# Patient Record
Sex: FEMALE | Race: WHITE | NOT HISPANIC OR LATINO | Employment: FULL TIME | URBAN - METROPOLITAN AREA
[De-identification: names, ages, dates, MRNs, and addresses within clinical notes are randomized per-mention and may not be internally consistent; named-entity substitution may affect disease eponyms.]

---

## 2018-08-08 ENCOUNTER — OFFICE VISIT (OUTPATIENT)
Dept: RETAIL CLINIC | Facility: CLINIC | Age: 48
End: 2018-08-08

## 2018-08-08 VITALS
HEART RATE: 89 BPM | DIASTOLIC BLOOD PRESSURE: 88 MMHG | RESPIRATION RATE: 18 BRPM | OXYGEN SATURATION: 98 % | SYSTOLIC BLOOD PRESSURE: 134 MMHG | TEMPERATURE: 98.6 F

## 2018-08-08 DIAGNOSIS — J06.9 ACUTE URI: Primary | ICD-10-CM

## 2018-08-08 DIAGNOSIS — H65.03 BILATERAL ACUTE SEROUS OTITIS MEDIA, RECURRENCE NOT SPECIFIED: ICD-10-CM

## 2018-08-08 DIAGNOSIS — J02.9 PHARYNGITIS, UNSPECIFIED ETIOLOGY: ICD-10-CM

## 2018-08-08 PROCEDURE — 99203 OFFICE O/P NEW LOW 30 MIN: CPT | Performed by: NURSE PRACTITIONER

## 2018-08-08 RX ORDER — PREDNISONE 20 MG/1
20 TABLET ORAL DAILY
Qty: 5 TABLET | Refills: 0 | Status: SHIPPED | OUTPATIENT
Start: 2018-08-08 | End: 2018-08-13

## 2018-08-08 NOTE — PATIENT INSTRUCTIONS
"Otitis Media With Effusion  Otitis media with effusion is the presence of fluid in the middle ear. This is a common problem in children, which often follows ear infections. It may be present for weeks or longer after the infection. Unlike an acute ear infection, otitis media with effusion refers only to fluid behind the ear drum and not infection. Children with repeated ear and sinus infections and allergy problems are the most likely to get otitis media with effusion.  CAUSES   The most frequent cause of the fluid buildup is dysfunction of the eustachian tubes. These are the tubes that drain fluid in the ears to the back of the nose (nasopharynx).  SYMPTOMS   · The main symptom of this condition is hearing loss. As a result, you or your child may:  ¨ Listen to the TV at a loud volume.  ¨ Not respond to questions.  ¨ Ask \"what\" often when spoken to.  ¨ Mistake or confuse one sound or word for another.  · There may be a sensation of fullness or pressure but usually not pain.  DIAGNOSIS   · Your health care provider will diagnose this condition by examining you or your child's ears.  · Your health care provider may test the pressure in you or your child's ear with a tympanometer.  · A hearing test may be conducted if the problem persists.  TREATMENT   · Treatment depends on the duration and the effects of the effusion.  · Antibiotics, decongestants, nose drops, and cortisone-type drugs (tablets or nasal spray) may not be helpful.  · Children with persistent ear effusions may have delayed language or behavioral problems. Children at risk for developmental delays in hearing, learning, and speech may require referral to a specialist earlier than children not at risk.  · You or your child's health care provider may suggest a referral to an ear, nose, and throat surgeon for treatment. The following may help restore normal hearing:  ¨ Drainage of fluid.  ¨ Placement of ear tubes (tympanostomy tubes).  ¨ Removal of adenoids " (adenoidectomy).  HOME CARE INSTRUCTIONS   · Avoid secondhand smoke.  · Infants who are  are less likely to have this condition.  · Avoid feeding infants while they are lying flat.  · Avoid known environmental allergens.  · Avoid people who are sick.  SEEK MEDICAL CARE IF:   · Hearing is not better in 3 months.  · Hearing is worse.  · Ear pain.  · Drainage from the ear.  · Dizziness.  MAKE SURE YOU:   · Understand these instructions.  · Will watch your condition.  · Will get help right away if you are not doing well or get worse.  This information is not intended to replace advice given to you by your health care provider. Make sure you discuss any questions you have with your health care provider.  Document Released: 01/25/2006 Document Revised: 01/08/2016 Document Reviewed: 07/15/2014  [employer] Interactive Patient Education © 2017 [employer]  Pharyngitis  Pharyngitis is redness, pain, and swelling (inflammation) of the throat (pharynx). It is a very common cause of sore throat. Pharyngitis can be caused by a bacteria, but it is usually caused by a virus. Most cases of pharyngitis get better on their own without treatment.  What are the causes?  This condition may be caused by:  · Infection by viruses (viral). Viral pharyngitis spreads from person to person (is contagious) through coughing, sneezing, and sharing of personal items or utensils such as cups, forks, spoons, and toothbrushes.  · Infection by bacteria (bacterial). Bacterial pharyngitis may be spread by touching the nose or face after coming in contact with the bacteria, or through more intimate contact, such as kissing.  · Allergies. Allergies can cause buildup of mucus in the throat (post-nasal drip), leading to inflammation and irritation. Allergies can also cause blocked nasal passages, forcing breathing through the mouth, which dries and irritates the throat.    What increases the risk?  You are more likely to develop this condition  if:  · You are 5-24 years old.  · You are exposed to crowded environments such as , school, or dormitory living.  · You live in a cold climate.  · You have a weakened disease-fighting (immune) system.    What are the signs or symptoms?  Symptoms of this condition vary by the cause (viral, bacterial, or allergies) and can include:  · Sore throat.  · Fatigue.  · Low-grade fever.  · Headache.  · Joint pain and muscle aches.  · Skin rashes.  · Swollen glands in the throat (lymph nodes).  · Plaque-like film on the throat or tonsils. This is often a symptom of bacterial pharyngitis.  · Vomiting.  · Stuffy nose (nasal congestion).  · Cough.  · Red, itchy eyes (conjunctivitis).  · Loss of appetite.    How is this diagnosed?  This condition is often diagnosed based on your medical history and a physical exam. Your health care provider will ask you questions about your illness and your symptoms. A swab of your throat may be done to check for bacteria (rapid strep test). Other lab tests may also be done, depending on the suspected cause, but these are rare.  How is this treated?  This condition usually gets better in 3-4 days without medicine. Bacterial pharyngitis may be treated with antibiotic medicines.  Follow these instructions at home:  · Take over-the-counter and prescription medicines only as told by your health care provider.  ? If you were prescribed an antibiotic medicine, take it as told by your health care provider. Do not stop taking the antibiotic even if you start to feel better.  ? Do not give children aspirin because of the association with Reye syndrome.  · Drink enough water and fluids to keep your urine clear or pale yellow.  · Get a lot of rest.  · Gargle with a salt-water mixture 3-4 times a day or as needed. To make a salt-water mixture, completely dissolve ½-1 tsp of salt in 1 cup of warm water.  · If your health care provider approves, you may use throat lozenges or sprays to soothe your  "throat.  Contact a health care provider if:  · You have large, tender lumps in your neck.  · You have a rash.  · You cough up green, yellow-brown, or bloody spit.  Get help right away if:  · Your neck becomes stiff.  · You drool or are unable to swallow liquids.  · You cannot drink or take medicines without vomiting.  · You have severe pain that does not go away, even after you take medicine.  · You have trouble breathing, and it is not caused by a stuffy nose.  · You have new pain and swelling in your joints such as the knees, ankles, wrists, or elbows.  Summary  · Pharyngitis is redness, pain, and swelling (inflammation) of the throat (pharynx).  · While pharyngitis can be caused by a bacteria, the most common causes are viral.  · Most cases of pharyngitis get better on their own without treatment.  · Bacterial pharyngitis is treated with antibiotic medicines.  This information is not intended to replace advice given to you by your health care provider. Make sure you discuss any questions you have with your health care provider.  Document Released: 12/18/2006 Document Revised: 01/23/2018 Document Reviewed: 01/23/2018  [employer] Interactive Patient Education © 2018 [employer]  Upper Respiratory Infection, Adult  Most upper respiratory infections (URIs) are a viral infection of the air passages leading to the lungs. A URI affects the nose, throat, and upper air passages. The most common type of URI is nasopharyngitis and is typically referred to as \"the common cold.\"  URIs run their course and usually go away on their own. Most of the time, a URI does not require medical attention, but sometimes a bacterial infection in the upper airways can follow a viral infection. This is called a secondary infection. Sinus and middle ear infections are common types of secondary upper respiratory infections.  Bacterial pneumonia can also complicate a URI. A URI can worsen asthma and chronic obstructive pulmonary disease (COPD). " Sometimes, these complications can require emergency medical care and may be life threatening.  What are the causes?  Almost all URIs are caused by viruses. A virus is a type of germ and can spread from one person to another.  What increases the risk?  You may be at risk for a URI if:  · You smoke.  · You have chronic heart or lung disease.  · You have a weakened defense (immune) system.  · You are very young or very old.  · You have nasal allergies or asthma.  · You work in crowded or poorly ventilated areas.  · You work in health care facilities or schools.    What are the signs or symptoms?  Symptoms typically develop 2-3 days after you come in contact with a cold virus. Most viral URIs last 7-10 days. However, viral URIs from the influenza virus (flu virus) can last 14-18 days and are typically more severe. Symptoms may include:  · Runny or stuffy (congested) nose.  · Sneezing.  · Cough.  · Sore throat.  · Headache.  · Fatigue.  · Fever.  · Loss of appetite.  · Pain in your forehead, behind your eyes, and over your cheekbones (sinus pain).  · Muscle aches.    How is this diagnosed?  Your health care provider may diagnose a URI by:  · Physical exam.  · Tests to check that your symptoms are not due to another condition such as:  ? Strep throat.  ? Sinusitis.  ? Pneumonia.  ? Asthma.    How is this treated?  A URI goes away on its own with time. It cannot be cured with medicines, but medicines may be prescribed or recommended to relieve symptoms. Medicines may help:  · Reduce your fever.  · Reduce your cough.  · Relieve nasal congestion.    Follow these instructions at home:  · Take medicines only as directed by your health care provider.  · Gargle warm saltwater or take cough drops to comfort your throat as directed by your health care provider.  · Use a warm mist humidifier or inhale steam from a shower to increase air moisture. This may make it easier to breathe.  · Drink enough fluid to keep your urine clear or  pale yellow.  · Eat soups and other clear broths and maintain good nutrition.  · Rest as needed.  · Return to work when your temperature has returned to normal or as your health care provider advises. You may need to stay home longer to avoid infecting others. You can also use a face mask and careful hand washing to prevent spread of the virus.  · Increase the usage of your inhaler if you have asthma.  · Do not use any tobacco products, including cigarettes, chewing tobacco, or electronic cigarettes. If you need help quitting, ask your health care provider.  How is this prevented?  The best way to protect yourself from getting a cold is to practice good hygiene.  · Avoid oral or hand contact with people with cold symptoms.  · Wash your hands often if contact occurs.    There is no clear evidence that vitamin C, vitamin E, echinacea, or exercise reduces the chance of developing a cold. However, it is always recommended to get plenty of rest, exercise, and practice good nutrition.  Contact a health care provider if:  · You are getting worse rather than better.  · Your symptoms are not controlled by medicine.  · You have chills.  · You have worsening shortness of breath.  · You have brown or red mucus.  · You have yellow or brown nasal discharge.  · You have pain in your face, especially when you bend forward.  · You have a fever.  · You have swollen neck glands.  · You have pain while swallowing.  · You have white areas in the back of your throat.  Get help right away if:  · You have severe or persistent:  ? Headache.  ? Ear pain.  ? Sinus pain.  ? Chest pain.  · You have chronic lung disease and any of the following:  ? Wheezing.  ? Prolonged cough.  ? Coughing up blood.  ? A change in your usual mucus.  · You have a stiff neck.  · You have changes in your:  ? Vision.  ? Hearing.  ? Thinking.  ? Mood.  This information is not intended to replace advice given to you by your health care provider. Make sure you discuss  any questions you have with your health care provider.  Document Released: 06/13/2002 Document Revised: 08/20/2017 Document Reviewed: 03/25/2015  [employer] Interactive Patient Education © 2018 [employer]

## 2018-08-08 NOTE — PROGRESS NOTES
Subjective   Patient ID: Letty Gutiérrez is a 47 y.o. female presents with   Chief Complaint   Patient presents with   • Sore Throat   • Earache       Sore Throat    This is a new problem. The current episode started in the past 7 days. The problem has been gradually worsening. The pain is worse on the left side. Maximum temperature: low grade. The pain is at a severity of 7/10. Associated symptoms include coughing, diarrhea (3-4 in 24 hours. none since this AM), ear pain, headaches and trouble swallowing. Pertinent negatives include no congestion, shortness of breath or vomiting. She has had no exposure to strep or mono. She has tried NSAIDs (coricidin) for the symptoms. The treatment provided mild relief.   Earache    There is pain in both ears. This is a new problem. The current episode started in the past 7 days. The problem occurs constantly. The pain is at a severity of 5/10. Associated symptoms include coughing, diarrhea (3-4 in 24 hours. none since this AM), headaches and a sore throat. Pertinent negatives include no rhinorrhea or vomiting. She has tried NSAIDs for the symptoms. The treatment provided no relief. There is no history of a chronic ear infection, hearing loss or a tympanostomy tube.       Allergies   Allergen Reactions   • Sulfa Antibiotics GI Intolerance       The following portions of the patient's history were reviewed and updated as appropriate: allergies, current medications, past family history, past medical history, past social history, past surgical history and problem list.      Review of Systems   Constitutional: Positive for chills, diaphoresis, fatigue and fever.   HENT: Positive for ear pain, postnasal drip, sneezing, sore throat and trouble swallowing. Negative for congestion, rhinorrhea, sinus pain and sinus pressure.    Respiratory: Positive for cough and wheezing. Negative for chest tightness and shortness of breath.    Cardiovascular: Negative.    Gastrointestinal: Positive for  diarrhea (3-4 in 24 hours. none since this AM) and nausea. Negative for vomiting.        Hx of diverticulitis     Neurological: Positive for headaches.       Objective     Vitals:    08/08/18 1909   BP: 134/88   Pulse: 89   Resp: 18   Temp: 98.6 °F (37 °C)   SpO2: 98%         Physical Exam   Constitutional: She is oriented to person, place, and time. She appears well-developed and well-nourished. She does not appear ill. No distress.   HENT:   Head: Normocephalic.   Right Ear: Hearing, external ear and ear canal normal. A middle ear effusion is present.   Left Ear: Hearing, external ear and ear canal normal. A middle ear effusion is present.   Nose: Mucosal edema present. No rhinorrhea or sinus tenderness.   Mouth/Throat: Mucous membranes are normal. Posterior oropharyngeal erythema (mild) present. Tonsils are 2+ on the right. Tonsils are 2+ on the left. No tonsillar exudate.   Eyes: Conjunctivae are normal.   Sclera white.   Neck: No tracheal deviation present.   Cardiovascular: Normal rate, regular rhythm, S1 normal, S2 normal and normal heart sounds.    Pulmonary/Chest: Effort normal and breath sounds normal. No accessory muscle usage. No respiratory distress.   Abdominal: Soft. Bowel sounds are normal. There is no tenderness.   Lymphadenopathy:     She has no cervical adenopathy.   Neurological: She is alert and oriented to person, place, and time.   Skin: Skin is warm and dry.   Vitals reviewed.        Letty was seen today for sore throat and earache.    Diagnoses and all orders for this visit:    Acute URI    Pharyngitis, unspecified etiology  -     predniSONE (DELTASONE) 20 MG tablet; Take 1 tablet by mouth Daily for 5 days.    Bilateral acute serous otitis media, recurrence not specified  -     predniSONE (DELTASONE) 20 MG tablet; Take 1 tablet by mouth Daily for 5 days.        Follow-up with Primary Care Physician in 48-72 hours if these symptoms worsen or fail to improve as anticipated. Patient  "verbalizes understanding.    Otitis Media With Effusion  Otitis media with effusion is the presence of fluid in the middle ear. This is a common problem in children, which often follows ear infections. It may be present for weeks or longer after the infection. Unlike an acute ear infection, otitis media with effusion refers only to fluid behind the ear drum and not infection. Children with repeated ear and sinus infections and allergy problems are the most likely to get otitis media with effusion.  CAUSES   The most frequent cause of the fluid buildup is dysfunction of the eustachian tubes. These are the tubes that drain fluid in the ears to the back of the nose (nasopharynx).  SYMPTOMS   · The main symptom of this condition is hearing loss. As a result, you or your child may:  ¨ Listen to the TV at a loud volume.  ¨ Not respond to questions.  ¨ Ask \"what\" often when spoken to.  ¨ Mistake or confuse one sound or word for another.  · There may be a sensation of fullness or pressure but usually not pain.  DIAGNOSIS   · Your health care provider will diagnose this condition by examining you or your child's ears.  · Your health care provider may test the pressure in you or your child's ear with a tympanometer.  · A hearing test may be conducted if the problem persists.  TREATMENT   · Treatment depends on the duration and the effects of the effusion.  · Antibiotics, decongestants, nose drops, and cortisone-type drugs (tablets or nasal spray) may not be helpful.  · Children with persistent ear effusions may have delayed language or behavioral problems. Children at risk for developmental delays in hearing, learning, and speech may require referral to a specialist earlier than children not at risk.  · You or your child's health care provider may suggest a referral to an ear, nose, and throat surgeon for treatment. The following may help restore normal hearing:  ¨ Drainage of fluid.  ¨ Placement of ear tubes (tympanostomy " tubes).  ¨ Removal of adenoids (adenoidectomy).  HOME CARE INSTRUCTIONS   · Avoid secondhand smoke.  · Infants who are  are less likely to have this condition.  · Avoid feeding infants while they are lying flat.  · Avoid known environmental allergens.  · Avoid people who are sick.  SEEK MEDICAL CARE IF:   · Hearing is not better in 3 months.  · Hearing is worse.  · Ear pain.  · Drainage from the ear.  · Dizziness.  MAKE SURE YOU:   · Understand these instructions.  · Will watch your condition.  · Will get help right away if you are not doing well or get worse.  This information is not intended to replace advice given to you by your health care provider. Make sure you discuss any questions you have with your health care provider.  Document Released: 01/25/2006 Document Revised: 01/08/2016 Document Reviewed: 07/15/2014  [employer] Interactive Patient Education © 2017 [employer]  Pharyngitis  Pharyngitis is redness, pain, and swelling (inflammation) of the throat (pharynx). It is a very common cause of sore throat. Pharyngitis can be caused by a bacteria, but it is usually caused by a virus. Most cases of pharyngitis get better on their own without treatment.  What are the causes?  This condition may be caused by:  · Infection by viruses (viral). Viral pharyngitis spreads from person to person (is contagious) through coughing, sneezing, and sharing of personal items or utensils such as cups, forks, spoons, and toothbrushes.  · Infection by bacteria (bacterial). Bacterial pharyngitis may be spread by touching the nose or face after coming in contact with the bacteria, or through more intimate contact, such as kissing.  · Allergies. Allergies can cause buildup of mucus in the throat (post-nasal drip), leading to inflammation and irritation. Allergies can also cause blocked nasal passages, forcing breathing through the mouth, which dries and irritates the throat.    What increases the risk?  You are more likely  to develop this condition if:  · You are 5-24 years old.  · You are exposed to crowded environments such as , school, or dormitory living.  · You live in a cold climate.  · You have a weakened disease-fighting (immune) system.    What are the signs or symptoms?  Symptoms of this condition vary by the cause (viral, bacterial, or allergies) and can include:  · Sore throat.  · Fatigue.  · Low-grade fever.  · Headache.  · Joint pain and muscle aches.  · Skin rashes.  · Swollen glands in the throat (lymph nodes).  · Plaque-like film on the throat or tonsils. This is often a symptom of bacterial pharyngitis.  · Vomiting.  · Stuffy nose (nasal congestion).  · Cough.  · Red, itchy eyes (conjunctivitis).  · Loss of appetite.    How is this diagnosed?  This condition is often diagnosed based on your medical history and a physical exam. Your health care provider will ask you questions about your illness and your symptoms. A swab of your throat may be done to check for bacteria (rapid strep test). Other lab tests may also be done, depending on the suspected cause, but these are rare.  How is this treated?  This condition usually gets better in 3-4 days without medicine. Bacterial pharyngitis may be treated with antibiotic medicines.  Follow these instructions at home:  · Take over-the-counter and prescription medicines only as told by your health care provider.  ? If you were prescribed an antibiotic medicine, take it as told by your health care provider. Do not stop taking the antibiotic even if you start to feel better.  ? Do not give children aspirin because of the association with Reye syndrome.  · Drink enough water and fluids to keep your urine clear or pale yellow.  · Get a lot of rest.  · Gargle with a salt-water mixture 3-4 times a day or as needed. To make a salt-water mixture, completely dissolve ½-1 tsp of salt in 1 cup of warm water.  · If your health care provider approves, you may use throat lozenges or  "sprays to soothe your throat.  Contact a health care provider if:  · You have large, tender lumps in your neck.  · You have a rash.  · You cough up green, yellow-brown, or bloody spit.  Get help right away if:  · Your neck becomes stiff.  · You drool or are unable to swallow liquids.  · You cannot drink or take medicines without vomiting.  · You have severe pain that does not go away, even after you take medicine.  · You have trouble breathing, and it is not caused by a stuffy nose.  · You have new pain and swelling in your joints such as the knees, ankles, wrists, or elbows.  Summary  · Pharyngitis is redness, pain, and swelling (inflammation) of the throat (pharynx).  · While pharyngitis can be caused by a bacteria, the most common causes are viral.  · Most cases of pharyngitis get better on their own without treatment.  · Bacterial pharyngitis is treated with antibiotic medicines.  This information is not intended to replace advice given to you by your health care provider. Make sure you discuss any questions you have with your health care provider.  Document Released: 12/18/2006 Document Revised: 01/23/2018 Document Reviewed: 01/23/2018  [employer] Interactive Patient Education © 2018 [employer]  Upper Respiratory Infection, Adult  Most upper respiratory infections (URIs) are a viral infection of the air passages leading to the lungs. A URI affects the nose, throat, and upper air passages. The most common type of URI is nasopharyngitis and is typically referred to as \"the common cold.\"  URIs run their course and usually go away on their own. Most of the time, a URI does not require medical attention, but sometimes a bacterial infection in the upper airways can follow a viral infection. This is called a secondary infection. Sinus and middle ear infections are common types of secondary upper respiratory infections.  Bacterial pneumonia can also complicate a URI. A URI can worsen asthma and chronic obstructive " pulmonary disease (COPD). Sometimes, these complications can require emergency medical care and may be life threatening.  What are the causes?  Almost all URIs are caused by viruses. A virus is a type of germ and can spread from one person to another.  What increases the risk?  You may be at risk for a URI if:  · You smoke.  · You have chronic heart or lung disease.  · You have a weakened defense (immune) system.  · You are very young or very old.  · You have nasal allergies or asthma.  · You work in crowded or poorly ventilated areas.  · You work in health care facilities or schools.    What are the signs or symptoms?  Symptoms typically develop 2-3 days after you come in contact with a cold virus. Most viral URIs last 7-10 days. However, viral URIs from the influenza virus (flu virus) can last 14-18 days and are typically more severe. Symptoms may include:  · Runny or stuffy (congested) nose.  · Sneezing.  · Cough.  · Sore throat.  · Headache.  · Fatigue.  · Fever.  · Loss of appetite.  · Pain in your forehead, behind your eyes, and over your cheekbones (sinus pain).  · Muscle aches.    How is this diagnosed?  Your health care provider may diagnose a URI by:  · Physical exam.  · Tests to check that your symptoms are not due to another condition such as:  ? Strep throat.  ? Sinusitis.  ? Pneumonia.  ? Asthma.    How is this treated?  A URI goes away on its own with time. It cannot be cured with medicines, but medicines may be prescribed or recommended to relieve symptoms. Medicines may help:  · Reduce your fever.  · Reduce your cough.  · Relieve nasal congestion.    Follow these instructions at home:  · Take medicines only as directed by your health care provider.  · Gargle warm saltwater or take cough drops to comfort your throat as directed by your health care provider.  · Use a warm mist humidifier or inhale steam from a shower to increase air moisture. This may make it easier to breathe.  · Drink enough fluid  to keep your urine clear or pale yellow.  · Eat soups and other clear broths and maintain good nutrition.  · Rest as needed.  · Return to work when your temperature has returned to normal or as your health care provider advises. You may need to stay home longer to avoid infecting others. You can also use a face mask and careful hand washing to prevent spread of the virus.  · Increase the usage of your inhaler if you have asthma.  · Do not use any tobacco products, including cigarettes, chewing tobacco, or electronic cigarettes. If you need help quitting, ask your health care provider.  How is this prevented?  The best way to protect yourself from getting a cold is to practice good hygiene.  · Avoid oral or hand contact with people with cold symptoms.  · Wash your hands often if contact occurs.    There is no clear evidence that vitamin C, vitamin E, echinacea, or exercise reduces the chance of developing a cold. However, it is always recommended to get plenty of rest, exercise, and practice good nutrition.  Contact a health care provider if:  · You are getting worse rather than better.  · Your symptoms are not controlled by medicine.  · You have chills.  · You have worsening shortness of breath.  · You have brown or red mucus.  · You have yellow or brown nasal discharge.  · You have pain in your face, especially when you bend forward.  · You have a fever.  · You have swollen neck glands.  · You have pain while swallowing.  · You have white areas in the back of your throat.  Get help right away if:  · You have severe or persistent:  ? Headache.  ? Ear pain.  ? Sinus pain.  ? Chest pain.  · You have chronic lung disease and any of the following:  ? Wheezing.  ? Prolonged cough.  ? Coughing up blood.  ? A change in your usual mucus.  · You have a stiff neck.  · You have changes in your:  ? Vision.  ? Hearing.  ? Thinking.  ? Mood.  This information is not intended to replace advice given to you by your health care  provider. Make sure you discuss any questions you have with your health care provider.  Document Released: 06/13/2002 Document Revised: 08/20/2017 Document Reviewed: 03/25/2015  [employer] Interactive Patient Education © 2018 [employer]

## 2019-03-25 ENCOUNTER — TELEPHONE (OUTPATIENT)
Dept: SURGERY | Facility: CLINIC | Age: 49
End: 2019-03-25

## 2019-03-25 DIAGNOSIS — R92.8 ABNORMAL MAMMOGRAM: Primary | ICD-10-CM

## 2019-03-25 NOTE — TELEPHONE ENCOUNTER
Scheduled Bilateral 3D Diag Mammo and Bilateral Breast U/S  Attn: Dr Barrow  3-29-19 @ 9:00      Spoke to pt she v/u with appts  kdl

## 2019-03-29 ENCOUNTER — APPOINTMENT (OUTPATIENT)
Dept: MAMMOGRAPHY | Facility: HOSPITAL | Age: 49
End: 2019-03-29

## 2019-03-29 ENCOUNTER — APPOINTMENT (OUTPATIENT)
Dept: ULTRASOUND IMAGING | Facility: HOSPITAL | Age: 49
End: 2019-03-29

## 2019-04-01 ENCOUNTER — TELEPHONE (OUTPATIENT)
Dept: SURGERY | Facility: CLINIC | Age: 49
End: 2019-04-01

## 2019-04-01 NOTE — TELEPHONE ENCOUNTER
Harry garzag mammo tonya scheduled Tuesday 4/16/19 arrive 7:45 am   Dr. Barrow to do.     Patient will see Dr. Carvalho same day arrive 9:00.     Images ordered. Lml

## 2019-04-16 ENCOUNTER — HOSPITAL ENCOUNTER (OUTPATIENT)
Dept: MAMMOGRAPHY | Facility: HOSPITAL | Age: 49
Discharge: HOME | End: 2019-04-16
Admitting: SURGERY

## 2019-04-16 ENCOUNTER — OFFICE VISIT (OUTPATIENT)
Dept: SURGERY | Facility: CLINIC | Age: 49
End: 2019-04-16

## 2019-04-16 ENCOUNTER — HOSPITAL ENCOUNTER (OUTPATIENT)
Dept: ULTRASOUND IMAGING | Facility: HOSPITAL | Age: 49
Discharge: HOME | End: 2019-04-16

## 2019-04-16 VITALS
DIASTOLIC BLOOD PRESSURE: 86 MMHG | SYSTOLIC BLOOD PRESSURE: 140 MMHG | OXYGEN SATURATION: 98 % | HEIGHT: 68 IN | BODY MASS INDEX: 37.47 KG/M2 | WEIGHT: 247.2 LBS | HEART RATE: 80 BPM

## 2019-04-16 DIAGNOSIS — Z72.0 TOBACCO USE: ICD-10-CM

## 2019-04-16 DIAGNOSIS — R92.8 ABNORMAL MAMMOGRAM: ICD-10-CM

## 2019-04-16 DIAGNOSIS — E66.9 OBESITY (BMI 30-39.9): ICD-10-CM

## 2019-04-16 DIAGNOSIS — R92.8 ABNORMAL MAMMOGRAM: Primary | ICD-10-CM

## 2019-04-16 DIAGNOSIS — Z80.3 FH: BREAST CANCER: ICD-10-CM

## 2019-04-16 DIAGNOSIS — R92.8 ABNORMAL ULTRASOUND OF BREAST: ICD-10-CM

## 2019-04-16 PROCEDURE — 76642 ULTRASOUND BREAST LIMITED: CPT

## 2019-04-16 PROCEDURE — 77066 DX MAMMO INCL CAD BI: CPT

## 2019-04-16 PROCEDURE — 99243 OFF/OP CNSLTJ NEW/EST LOW 30: CPT | Performed by: SURGERY

## 2019-04-16 RX ORDER — SUCRALFATE 1 G/1
TABLET ORAL
COMMUNITY
Start: 2019-03-19

## 2019-04-16 RX ORDER — AMLODIPINE BESYLATE 5 MG/1
5 TABLET ORAL DAILY
Refills: 0 | COMMUNITY
Start: 2019-03-26

## 2019-04-16 RX ORDER — LISINOPRIL AND HYDROCHLOROTHIAZIDE 25; 20 MG/1; MG/1
TABLET ORAL
Refills: 0 | COMMUNITY
Start: 2019-03-27

## 2019-04-16 RX ORDER — PANTOPRAZOLE SODIUM 40 MG/1
TABLET, DELAYED RELEASE ORAL
COMMUNITY
Start: 2019-03-19

## 2019-04-16 NOTE — PROGRESS NOTES
Chief Complaint: Letty Gutiérrez is a 48 y.o. female who was seen in consultation at the request of PASHA Sosa  for abnormal breast imaging    History of Present Illness:  Patient presents with abnormal breast imaging. She noted no new masses, skin changes, nipple discharge, nipple changes prior to her most recent imaging.    Her most recent imaging includes the followin2019  [place]   Mammo  Letty Gutiérrez  MAMMO SCREENING BILATERAL  Heterogeneously dense.  Focal asymmetries bilateral retroareolar and deep posterior right breast.  BI-RADS CATEGORY: 0    2019  [facility]    Radiology  Letty Gutiérrez  BILATERAL DIAGNOSTIC MAMMOGRAM  DIAGNOSTIC BILATERAL BREAST ULRASOUND  On compression view suggestion of an 8 mm diameter irregular density 8 cm from the nipple in the right breast; suggestion of several poorly defined nodular densities in the retroareolar regions, left somewhat greater than right.  ULTRASOUND:  Right breast:  12:00 8 cm from the nipple, corresponding to findings on right view hypoechoic structure measuring 8 x 4 mm. Microlobulated. Minimally suspicious for neoplasia. Mildly prominent ductal structures in the retroareolar regions all quadrants. 3:00 1 cm from the nipple measures 4 mm. Minimally suspicious for neoplasia. At 7:00 3 cm from the nipple hypoechoic elongated structure 1.3 x 3.9 mm having the appearance of a cyst. 9:00 5 cm from the nipple very irregular structure which is heterogeneous and quite difficult to classify in its entirety. Some suspicious features of poorly defined margins and possibly taller than wide 5 x 7 mm.  Left breast:  1:00 7 cm from the nipple 5 to 6 mm in length. Mildly suspicious for neoplasia. 1:00 3 cm from the nipple mixed echogenic mass. 5.4 x 2.1 mm minimally suspicious for neoplasia. 2:00 3 cm from the nipple area of heterogeneity. Within the center of this is a hypoechoic structure measuring 2.6 x 2.0 mm. minimally suspicious for  neoplasia. 8:00 1 cm from the nipple. Appearance of a possible cyst 4 x 8 mm.  IMPRESSION:  Because of the numerous densities, I would recommend that the patient have bilateral breast MRI.  BI-RADS CATEGORY: 0      She has not had a breast biopsy in the past.  She has her uterus and ovaries, is premenopausal, and takes nor hormones.  Her family history includes the following: She has no daughters, 1 son, 1 brother, 3 sisters, 2 maternal aunts, one paternal aunt.  She has a maternal aunt who had breast cancer in her mid 50s and a maternal great aunt who had breast cancer she is uncertain what age.  Her mother did not had breast cancer and out of her maternal cousins have had breast cancer.    She is here for evaluation.    Review of Systems:  Review of Systems   Eyes: Positive for eye problems (BLURRINESS).   Musculoskeletal: Positive for arthralgias and back pain.   Psychiatric/Behavioral: Positive for confusion, decreased concentration, depression and sleep disturbance. The patient is nervous/anxious.    All other systems reviewed and are negative.       Past Medical and Surgical History:  Breast Biopsy History:  Patient has not had a breast biopsy in the past.  Breast Cancer HIstory:  Patient does not have a past medical history of breast cancer.  Breast Operations, and year:  NONE   Obstetric/Gynecologic History:  Age menstrual periods began:16  Patient is premenopausal, first day of last period: 4/12/19  Number of pregnancies:1  Number of live births: 1  Number of abortions or miscarriages: 0  Age of delivery of first child: 32  Patient did not breast feed.  Length of time taking birth control pills: 20-22  Patient has never taken hormone replacement  PATIENT STILL HAS UTERUS AND OVARIES     Past Surgical History:   Procedure Laterality Date   • CHOLECYSTECTOMY       Past Medical History:   Diagnosis Date   • Acid reflux    • Depression    • Diverticulitis    • Elevated cholesterol    • Gastritis    • Hematuria   "  • Hypertension    • IBS (irritable bowel syndrome)    • Migraines    • Restless leg    • Restless leg    • Vitamin D deficiency        Prior Hospitalizations, other than for surgery or childbirth, and year:  NONE     Social History     Socioeconomic History   • Marital status:      Spouse name: Not on file   • Number of children: Not on file   • Years of education: Not on file   • Highest education level: Not on file   Tobacco Use   • Smoking status: Current Every Day Smoker     Packs/day: 0.50     Start date: 1994   • Smokeless tobacco: Never Used   Substance and Sexual Activity   • Alcohol use: Yes     Comment: rarely   • Drug use: No     Patient is .  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    Family History:  Family History   Problem Relation Age of Onset   • Heart disease Mother    • Alzheimer's disease Mother    • Parkinsonism Mother    • Heart disease Father    • Diabetes Father    • Breast cancer Maternal Aunt         50s   • Breast cancer Maternal Aunt         50S       Vital Signs:  /86 (BP Location: Left arm, Patient Position: Sitting, Cuff Size: Adult)   Pulse 80   Ht 172.7 cm (68\")   Wt 112 kg (247 lb 3.2 oz)   LMP 04/12/2019 (Approximate)   SpO2 98%   Breastfeeding? No   BMI 37.59 kg/m²      Medications:    Current Outpatient Medications:   •  amLODIPine (NORVASC) 5 MG tablet, Take 5 mg by mouth Daily., Disp: , Rfl: 0  •  Lactobacillus (PROBIOTIC ACIDOPHILUS PO), Take  by mouth., Disp: , Rfl:   •  lisinopril-hydrochlorothiazide (PRINZIDE,ZESTORETIC) 20-25 MG per tablet, , Disp: , Rfl: 0  •  pantoprazole (PROTONIX) 40 MG EC tablet, , Disp: , Rfl:   •  RANITIDINE HCL PO, Take  by mouth., Disp: , Rfl:   •  sucralfate (CARAFATE) 1 g tablet, , Disp: , Rfl:   •  Chlorpheniramine-DM (CORICIDIN HBP COUGH/COLD PO), Take  by mouth., Disp: , Rfl:      Allergies:  Allergies   Allergen Reactions " "  • Sulfa Antibiotics GI Intolerance       Physical Examination:  /86 (BP Location: Left arm, Patient Position: Sitting, Cuff Size: Adult)   Pulse 80   Ht 172.7 cm (68\")   Wt 112 kg (247 lb 3.2 oz)   LMP 04/12/2019 (Approximate)   SpO2 98%   Breastfeeding? No   BMI 37.59 kg/m²   General Appearance:  Patient is in no distress.  She is well kept and has an obese build.   Psychiatric:  Patient with appropriate mood and affect. Alert and oriented to self, time, and place.    Breast, RIGHT:  small sized, 40B, symmetric with the contralateral side.  Breast skin is without erythema, edema, rashes.  There are no visible abnormalities upon inspection during the arm-raising maneuver or with hands on hips in the sitting position. There is no nipple retraction, discharge or nipple/areolar skin changes.There are no masses palpable in the sitting or supine positions. Specifically at the RIGHT 12:00, 8 CFN location there are no focal findings/masses.    Breast, LEFT:  small sized, 40B, symmetric with the contralateral side.  Breast skin is without erythema, edema, rashes.  There are no visible abnormalities upon inspection during the arm-raising maneuver or with hands on hips in the sitting position. There is no nipple retraction, discharge or nipple/areolar skin changes.There are no masses palpable in the sitting or supine positions.    Lymphatic:  There is no axillary, cervical, infraclavicular, or supraclavicular adenopathy bilaterally.  Eyes:  Pupils are round and reactive to light.  Cardiovascular:  Heart rate and rhythm are regular.  Respiratory:  Lungs are clear bilaterally with no crackles or wheezes in any lung field.  Gastrointestinal:  Abdomen is soft, nondistended, and nontender.     Musculoskeletal:  Good strength in all 4 extremities.   There is good range of motion in both shoulders.    Skin:  No new skin lesions or rashes on the skin excluding the breast (see breast exam " above).        Imagin2019  [place]   Mammo  [facility]  MAMMO SCREENING BILATERAL  Heterogeneously dense.  Focal asymmetries bilateral retroareolar and deep posterior right breast.  BI-RADS CATEGORY: 0    2019  [facility]  BILATERAL DIAGNOSTIC MAMMOGRAM  DIAGNOSTIC BILATERAL BREAST ULRASOUND  On compression view suggestion of an 8 mm diameter irregular density 8 cm from the nipple in the right breast; suggestion of several poorly defined nodular densities in the retroareolar regions, left somewhat greater than right.  ULTRASOUND:  Right breast:  12:00 8 cm from the nipple, corresponding to findings on right view hypoechoic structure measuring 8 x 4 mm. Microlobulated. Minimally suspicious for neoplasia. Mildly prominent ductal structures in the retroareolar regions all quadrants. 3:00 1 cm from the nipple measures 4 mm. Minimally suspicious for neoplasia. At 7:00 3 cm from the nipple hypoechoic elongated structure 1.3 x 3.9 mm having the appearance of a cyst. 9:00 5 cm from the nipple very irregular structure which is heterogeneous and quite difficult to classify in its entirety. Some suspicious features of poorly defined margins and possibly taller than wide 5 x 7 mm.  Left breast:  1:00 7 cm from the nipple 5 to 6 mm in length. Mildly suspicious for neoplasia. 1:00 3 cm from the nipple mixed echogenic mass. 5.4 x 2.1 mm minimally suspicious for neoplasia. 2:00 3 cm from the nipple area of heterogeneity. Within the center of this is a hypoechoic structure measuring 2.6 x 2.0 mm. minimally suspicious for neoplasia. 8:00 1 cm from the nipple. Appearance of a possible cyst 4 x 8 mm.  IMPRESSION:  Because of the numerous densities, I would recommend that the patient have bilateral breast MRI.  BI-RADS CATEGORY: 0    Procedures:      Assessment:   Diagnosis Plan   1. Abnormal mammogram     2. Abnormal ultrasound of breast     3. Obesity (BMI 30-39.9)     4. FH: breast  cancer     5. Tobacco use     1-2  RIGHT breast 12:00, 8 CFN- density on mammogram, 8mm complex cyst on ultrasound- BR3 RIGHT US. LEFT imaging negative.    3-  BMI 37    4-  MA in her mid 50s, MGA, age uncertain    5-  1/2ppd since 1994    Plan:  We reviewed her history, outside imaging reports, imaging here at [facility] and report, and exam together today. (note that we ordered her repeat workup today for prior to her visit)    Dr Barrow and I spoke about her imaging and imaging reports and he has a copy of those from 2-25-19 and 3-21-19 [facility]..    The screening mammogram showed only one area about which he had any suspicion, and on ultrasound this showed a BR3 lesion at 12:00 RIGHT.    He did not feel that there were any other concerning findings requiring workup.    The patient and I discussed by phone that we had 2 options based on her outside imaging (which was bilateral BIRADS 0, recommend MRI, after Dx bilateral mammogram and US). These options were to proceed with MRI or to repeat the diagnostic imaging with mammography and ultrasound with our breast trained imager.  We agreed upon the latter.  She had this imaging workup done today .  She had a favorable report today.     We discussed that screening mammograms are used to detect changes in imaging from the prior year or to detect findings at baseline. If there are changes seen, this study is followed by diagnostic imaging, often a diagnostic mammogram with or without targeted breast ultrasound. We discussed that a BIRADS 3 designation describes an imaging finding that is 97-98% likely to represent a benign process. We discussed that the most common management of a BIRADS 3 finding is 6 month imaging surveillance with examination. We discussed that the alternate management option is to biopsy the lesion, if the concern and anxiety over the imaging was not acceptable to the patient. She understood the above, and wished to proceed with imaging surveillance in  6 months with an exam thereafter.    I will arrange for a RIGHT breast utlrasound here at [facility] for  and to see me back after.       We also reviewed her FH breast cancer and we discussed that her MA may consider talking with genetics due to her young age at diagnosis.    I asked her to continue her SBE  monthly and to let us know if she has any changes or concerns in the interim.      Tatum Carvalho MD        Next Appointment:  Return for Next scheduled follow up, after imaging.      EMR Dragon/transcription disclaimer:    Much of this encounter note is an electronic transcription/translocation of spoken language to printed text.  The electronic translation of spoken language may permit erroneous, or at times, nonsensical words or phrases to be inadvertently transcribed.  Although I have reviewed the note from such areas, some may still exist.

## 2019-04-18 ENCOUNTER — TELEPHONE (OUTPATIENT)
Dept: SURGERY | Facility: CLINIC | Age: 49
End: 2019-04-18

## 2019-04-18 DIAGNOSIS — R92.8 ABNORMAL MAMMOGRAM: Primary | ICD-10-CM

## 2019-04-18 NOTE — TELEPHONE ENCOUNTER
Final report on bilateral diagnostic mammogram and right breast ultrasound [facility] Dr. Barrow dated April 17, 2019: With additional imaging no abnormality is seen within the retroareolar regions of both breasts.  Comparison is made to prior mammograms of March 21, 2019 and February 25, 2019.  In the posterior one third right breast there is a persistent asymmetry at locates to the superior half of the breast near 12:00.  Measures approximately 8 mm.  Right breast ultrasound shows at the 12 o'clock position, 8 cm from the nipple is a 7 mm mass with macrolobulated.  BI-RADS 3 recommend six-month mammographic and sonographic follow-up of the right breast.  There is no mammographic evidence for any abnormality in the retroareolar region of either breast.  BI-RADS 3    Will add mammogram to her order.

## 2019-04-30 ENCOUNTER — TELEPHONE (OUTPATIENT)
Dept: SURGERY | Facility: CLINIC | Age: 49
End: 2019-04-30

## 2019-04-30 NOTE — TELEPHONE ENCOUNTER
SPOKE W/ PT IN REGARDS TO APPTS SCHEDULED. PT REQUESTED I MAIL APPT DATES & TIMES. PT WILL CALL IF CONFLICT.        KL

## 2019-07-18 ENCOUNTER — TELEPHONE (OUTPATIENT)
Dept: SURGERY | Facility: CLINIC | Age: 49
End: 2019-07-18

## 2019-07-18 NOTE — TELEPHONE ENCOUNTER
Alyse called, canceled her imaging at [facility], said radiologist is not in net work.    She is going to call back and let me know   Who is in net work, so we can get her imaging scheduled.      claudy

## 2019-07-18 NOTE — TELEPHONE ENCOUNTER
Scheduled Left Breast Stereostactic Biopsy x2 with Post Bx Mammo.  [facility] (Dr Barrow) 7-23-19 arrive 11:30      See Dr Albert on   7-26-19 arrive 11:30    Spoke to pt she v/u with appts  kdl

## 2019-07-18 NOTE — TELEPHONE ENCOUNTER
Scheduled Consult with   Dr Saavedra and Dr Tapia  7-25-19 arrive at 7:00    Spoke to pt she is aware of these appts  kdl

## 2019-10-15 ENCOUNTER — APPOINTMENT (OUTPATIENT)
Dept: MAMMOGRAPHY | Facility: HOSPITAL | Age: 49
End: 2019-10-15

## 2019-10-15 ENCOUNTER — APPOINTMENT (OUTPATIENT)
Dept: ULTRASOUND IMAGING | Facility: HOSPITAL | Age: 49
End: 2019-10-15

## 2019-11-05 ENCOUNTER — TELEPHONE (OUTPATIENT)
Dept: SURGERY | Facility: CLINIC | Age: 49
End: 2019-11-05

## 2019-11-05 NOTE — TELEPHONE ENCOUNTER
Pt No Showed for her imaging.    I have rescheduled her Rt Breast Diagnostic 3D Mammo and Rt Breast U/S   @ [facility] 12-6-19 @ 9:00    Return to see Dr REED 1-17-20 arrive 9:45    Lm on pt's phone  [email or web address]

## 2019-12-06 ENCOUNTER — APPOINTMENT (OUTPATIENT)
Dept: ULTRASOUND IMAGING | Facility: HOSPITAL | Age: 49
End: 2019-12-06

## 2019-12-06 ENCOUNTER — APPOINTMENT (OUTPATIENT)
Dept: MAMMOGRAPHY | Facility: HOSPITAL | Age: 49
End: 2019-12-06

## 2019-12-27 ENCOUNTER — HOSPITAL ENCOUNTER (OUTPATIENT)
Dept: ULTRASOUND IMAGING | Facility: HOSPITAL | Age: 49
Discharge: HOME | End: 2019-12-27

## 2019-12-27 ENCOUNTER — TELEPHONE (OUTPATIENT)
Dept: SURGERY | Facility: CLINIC | Age: 49
End: 2019-12-27

## 2019-12-27 ENCOUNTER — HOSPITAL ENCOUNTER (OUTPATIENT)
Dept: MAMMOGRAPHY | Facility: HOSPITAL | Age: 49
Discharge: HOME | End: 2019-12-27
Admitting: SURGERY

## 2019-12-27 DIAGNOSIS — R92.8 ABNORMAL MAMMOGRAM: ICD-10-CM

## 2019-12-27 DIAGNOSIS — R92.8 ABNORMAL ULTRASOUND OF BREAST: ICD-10-CM

## 2019-12-27 PROCEDURE — 76642 ULTRASOUND BREAST LIMITED: CPT

## 2019-12-27 PROCEDURE — G0279 TOMOSYNTHESIS, MAMMO: HCPCS

## 2019-12-27 PROCEDURE — 77065 DX MAMMO INCL CAD UNI: CPT

## 2019-12-27 NOTE — TELEPHONE ENCOUNTER
Right diagnostic mammogram with 3D December 27, 2019: And right breast ultrasound.  [facility].  Scattered fibroglandular densities.  Nodular density posterior one third right breast is less apparent.  Correlating right breast ultrasound demonstrates a hypoechoic nodule at 12:00, 8 cm from the nipple measuring 5 x 5 mm.  Nodule is slightly smaller since the ultrasound exam of April 16, 2019 when it measured 7 x 7 mm.  Conclusion benign right-sided mammogram and right breast ultrasound.  Recommend annual mammogram and limited right breast ultrasound in April 2020. BR2

## 2020-01-10 ENCOUNTER — TELEPHONE (OUTPATIENT)
Dept: SURGERY | Facility: CLINIC | Age: 50
End: 2020-01-10

## 2020-01-10 NOTE — TELEPHONE ENCOUNTER
Moved patient;'s appointment off of 1-17-20 to 1-15-20 arrive 3:15    Lm on patient's phone.  rene

## 2020-03-27 DIAGNOSIS — R92.8 ABNORMAL ULTRASOUND OF BREAST: Primary | ICD-10-CM

## 2020-04-02 ENCOUNTER — TELEPHONE (OUTPATIENT)
Dept: SURGERY | Facility: CLINIC | Age: 50
End: 2020-04-02

## 2020-04-02 NOTE — TELEPHONE ENCOUNTER
[facility] [place] 6/5/2020 12:45  Follow up rescheduled due to COVID19  Friday 7/24/2020 2:00.   Patient has no new concerns.

## 2020-06-03 RX ORDER — DICYCLOMINE HYDROCHLORIDE 10 MG/1
10 CAPSULE ORAL 3 TIMES DAILY PRN
Qty: 90 CAPSULE | Refills: 5 | Status: SHIPPED | OUTPATIENT
Start: 2020-06-03

## 2020-06-05 ENCOUNTER — HOSPITAL ENCOUNTER (OUTPATIENT)
Dept: ULTRASOUND IMAGING | Facility: HOSPITAL | Age: 50
Discharge: HOME | End: 2020-06-05
Admitting: SURGERY

## 2020-06-05 ENCOUNTER — HOSPITAL ENCOUNTER (OUTPATIENT)
Dept: MAMMOGRAPHY | Facility: HOSPITAL | Age: 50
Discharge: HOME | End: 2020-06-05

## 2020-06-05 DIAGNOSIS — R92.8 ABNORMAL ULTRASOUND OF BREAST: ICD-10-CM

## 2020-06-05 PROCEDURE — G0279 TOMOSYNTHESIS, MAMMO: HCPCS

## 2020-06-05 PROCEDURE — 76642 ULTRASOUND BREAST LIMITED: CPT

## 2020-06-05 PROCEDURE — 77066 DX MAMMO INCL CAD BI: CPT

## 2020-06-09 ENCOUNTER — TELEPHONE (OUTPATIENT)
Dept: SURGERY | Facility: CLINIC | Age: 50
End: 2020-06-09

## 2020-06-09 NOTE — TELEPHONE ENCOUNTER
[facility] bilateral mammogram with right breast ultrasound: Scattered fibroglandular densities.  No suspicious findings in either breast.  Right ultrasound 12:00, 8 cm from the nipple there is a stable 4 mm benign complex cyst.  BI-RADS 2

## 2020-07-09 ENCOUNTER — TELEPHONE (OUTPATIENT)
Dept: SURGERY | Facility: CLINIC | Age: 50
End: 2020-07-09

## 2020-07-16 ENCOUNTER — TELEPHONE (OUTPATIENT)
Dept: SURGERY | Facility: CLINIC | Age: 50
End: 2020-07-16

## 2020-12-02 ENCOUNTER — TELEPHONE (OUTPATIENT)
Dept: SURGERY | Facility: CLINIC | Age: 50
End: 2020-12-02

## 2020-12-02 NOTE — TELEPHONE ENCOUNTER
Julisa canceled her appt tomorrow with Dr REED.  She said work is going live with Epic and due to Covid in her work place.    I will call her after the holidays and get her rescheduled.    Melba

## 2021-01-14 ENCOUNTER — TELEPHONE (OUTPATIENT)
Dept: SURGERY | Facility: CLINIC | Age: 51
End: 2021-01-14

## 2021-04-30 ENCOUNTER — OFFICE VISIT (OUTPATIENT)
Dept: SURGERY | Facility: CLINIC | Age: 51
End: 2021-04-30

## 2021-04-30 VITALS
HEIGHT: 68 IN | BODY MASS INDEX: 41.52 KG/M2 | WEIGHT: 274 LBS | DIASTOLIC BLOOD PRESSURE: 72 MMHG | SYSTOLIC BLOOD PRESSURE: 124 MMHG | TEMPERATURE: 97.3 F | HEART RATE: 84 BPM | OXYGEN SATURATION: 98 %

## 2021-04-30 DIAGNOSIS — E66.01 MORBID (SEVERE) OBESITY DUE TO EXCESS CALORIES (HCC): ICD-10-CM

## 2021-04-30 DIAGNOSIS — R92.8 ABNORMAL ULTRASOUND OF BREAST: ICD-10-CM

## 2021-04-30 DIAGNOSIS — R92.8 ABNORMAL MAMMOGRAM: Primary | ICD-10-CM

## 2021-04-30 DIAGNOSIS — Z80.3 FH: BREAST CANCER: ICD-10-CM

## 2021-04-30 DIAGNOSIS — Z72.0 TOBACCO USE: ICD-10-CM

## 2021-04-30 PROCEDURE — 99213 OFFICE O/P EST LOW 20 MIN: CPT | Performed by: SURGERY

## 2021-04-30 RX ORDER — ACETAMINOPHEN AND CODEINE PHOSPHATE 120; 12 MG/5ML; MG/5ML
1 SOLUTION ORAL DAILY
COMMUNITY

## 2021-04-30 RX ORDER — AMLODIPINE BESYLATE 10 MG/1
10 TABLET ORAL DAILY
COMMUNITY
Start: 2021-03-25

## 2021-04-30 RX ORDER — BUPROPION HYDROCHLORIDE 100 MG/1
TABLET ORAL
COMMUNITY

## 2021-04-30 RX ORDER — BUSPIRONE HYDROCHLORIDE 10 MG/1
10 TABLET ORAL EVERY 12 HOURS
COMMUNITY
Start: 2021-02-05

## 2021-04-30 RX ORDER — CYCLOBENZAPRINE HCL 5 MG
5 TABLET ORAL
COMMUNITY
Start: 2021-04-29 | End: 2021-05-14

## 2021-04-30 RX ORDER — VENLAFAXINE HYDROCHLORIDE 75 MG/1
75 CAPSULE, EXTENDED RELEASE ORAL DAILY
COMMUNITY
End: 2021-04-30

## 2021-04-30 RX ORDER — AMOXICILLIN AND CLAVULANATE POTASSIUM 875; 125 MG/1; MG/1
TABLET, FILM COATED ORAL
COMMUNITY
Start: 2021-04-21 | End: 2021-04-30

## 2021-04-30 RX ORDER — CITALOPRAM 10 MG/1
10 TABLET ORAL DAILY
COMMUNITY
Start: 2021-03-25

## 2021-04-30 RX ORDER — CETIRIZINE HYDROCHLORIDE 5 MG/1
TABLET ORAL DAILY
COMMUNITY

## 2021-04-30 NOTE — PROGRESS NOTES
Chief Complaint: Letty Gutiérrez is a 50 y.o. female who was seen in consultation at the request of PASHA Sosa  for abnormal breast imaging and a followup visit    History of Present Illness:  Patient presents with abnormal breast imaging. She noted no new masses, skin changes, nipple discharge, nipple changes prior to her most recent imaging.    Her most recent imaging includes the followin2019  [facility]   Mammo  Letty Gutiérrez  MAMMO SCREENING BILATERAL  Heterogeneously dense.  Focal asymmetries bilateral retroareolar and deep posterior right breast.  BI-RADS CATEGORY: 0    2019  [facility]    Radiology  Letty Gutiérrez  BILATERAL DIAGNOSTIC MAMMOGRAM  DIAGNOSTIC BILATERAL BREAST ULRASOUND  On compression view suggestion of an 8 mm diameter irregular density 8 cm from the nipple in the right breast; suggestion of several poorly defined nodular densities in the retroareolar regions, left somewhat greater than right.  ULTRASOUND:  Right breast:  12:00 8 cm from the nipple, corresponding to findings on right view hypoechoic structure measuring 8 x 4 mm. Microlobulated. Minimally suspicious for neoplasia. Mildly prominent ductal structures in the retroareolar regions all quadrants. 3:00 1 cm from the nipple measures 4 mm. Minimally suspicious for neoplasia. At 7:00 3 cm from the nipple hypoechoic elongated structure 1.3 x 3.9 mm having the appearance of a cyst. 9:00 5 cm from the nipple very irregular structure which is heterogeneous and quite difficult to classify in its entirety. Some suspicious features of poorly defined margins and possibly taller than wide 5 x 7 mm.  Left breast:  1:00 7 cm from the nipple 5 to 6 mm in length. Mildly suspicious for neoplasia. 1:00 3 cm from the nipple mixed echogenic mass. 5.4 x 2.1 mm minimally suspicious for neoplasia. 2:00 3 cm from the nipple area of heterogeneity. Within the center of this is a hypoechoic structure measuring 2.6 x 2.0 mm. minimally  suspicious for neoplasia. 8:00 1 cm from the nipple. Appearance of a possible cyst 4 x 8 mm.  IMPRESSION:  Because of the numerous densities, I would recommend that the patient have bilateral breast MRI.  BI-RADS CATEGORY: 0      She has not had a breast biopsy in the past.  She has her uterus and ovaries, is premenopausal, and takes nor hormones.  Her family history includes the following: She has no daughters, 1 son, 1 brother, 3 sisters, 2 maternal aunts, one paternal aunt.  She has a maternal aunt who had breast cancer in her mid 50s and a maternal great aunt who had breast cancer she is uncertain what age.  Her mother did not had breast cancer and out of her maternal cousins have had breast cancer.      Interval HIstory  In the interim,  Letty Gutiérrez  has done well.  She has noted no changes in her breast exam. No new masses, skin changes, nipple changes, nipple discharge either breast.   She denies headache, bone pain, belly pain, cough, changes in vision or gait.    Her most recent imaging includes the following:  Final report on bilateral diagnostic mammogram and right breast ultrasound [facility] Dr. Barrow dated April 17, 2019: With additional imaging no abnormality is seen within the retroareolar regions of both breasts.  Comparison is made to prior mammograms of March 21, 2019 and February 25, 2019.  In the posterior one third right breast there is a persistent asymmetry at locates to the superior half of the breast near 12:00.  Measures approximately 8 mm.  Right breast ultrasound shows at the 12 o'clock position, 8 cm from the nipple is a 7 mm mass with macrolobulated.  BI-RADS 3 recommend six-month mammographic and sonographic follow-up of the right breast.  There is no mammographic evidence for any abnormality in the retroareolar region of either breast.  BI-RADS 3    We then arranged for a RIGHT DX mammo and RIGHT US:    Right diagnostic mammogram with 3D December 27, 2019: And  right breast ultrasound.  [facility].  Scattered fibroglandular densities.  Nodular density posterior one third right breast is less apparent.  Correlating right breast ultrasound demonstrates a hypoechoic nodule at 12:00, 8 cm from the nipple measuring 5 x 5 mm.  Nodule is slightly smaller since the ultrasound exam of April 16, 2019 when it measured 7 x 7 mm.  Conclusion benign right-sided mammogram and right breast ultrasound.  Recommend annual mammogram and limited right breast ultrasound in April 2020. BR2      We then arranged for a bilateral mammogram and RIGHT ultrasound:  6-5-20 [facility] bilateral mammogram with right breast ultrasound: Scattered fibroglandular densities.  No suspicious findings in either breast.  Right ultrasound 12:00, 8 cm from the nipple there is a stable 4 mm benign complex cyst.  BI-RADS 2    She is here for review.  She has gained 27 pounds in the interim.        Review of Systems:  Review of Systems   Constitutional: Positive for unexpected weight change (27 lb wt gain ).   Eyes: Eye problems: BLURRINESS.   All other systems reviewed and are negative.       Past Medical and Surgical History:  Breast Biopsy History:  Patient has not had a breast biopsy in the past.  Breast Cancer HIstory:  Patient does not have a past medical history of breast cancer.  Breast Operations, and year:  NONE   Obstetric/Gynecologic History:  Age menstrual periods began:16  Patient is premenopausal, first day of last period: 4/12/19  Number of pregnancies:1  Number of live births: 1  Number of abortions or miscarriages: 0  Age of delivery of first child: 32  Patient did not breast feed.  Length of time taking birth control pills: 20-22  Patient has never taken hormone replacement  PATIENT STILL HAS UTERUS AND OVARIES     Past Surgical History:   Procedure Laterality Date   • CHOLECYSTECTOMY       Past Medical History:   Diagnosis Date   • Acid reflux    • Depression    •  "Diverticulitis    • Elevated cholesterol    • Gastritis    • Hematuria    • Hypertension    • IBS (irritable bowel syndrome)    • Migraines    • Restless leg    • Restless leg    • Vitamin D deficiency        Prior Hospitalizations, other than for surgery or childbirth, and year:  NONE     Social History     Socioeconomic History   • Marital status:      Spouse name: Not on file   • Number of children: Not on file   • Years of education: Not on file   • Highest education level: Not on file   Tobacco Use   • Smoking status: Current Every Day Smoker     Packs/day: 0.50     Start date: 1994   • Smokeless tobacco: Never Used   Substance and Sexual Activity   • Alcohol use: Yes     Comment: rarely   • Drug use: No     Patient is .  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    Family History:  Family History   Problem Relation Age of Onset   • Heart disease Mother    • Alzheimer's disease Mother    • Parkinsonism Mother    • Heart disease Father    • Diabetes Father    • Breast cancer Maternal Aunt         50s   • Breast cancer Maternal Aunt         50S       Vital Signs:  /72   Pulse 84   Temp 97.3 °F (36.3 °C)   Ht 172.7 cm (68\")   Wt 124 kg (274 lb)   LMP  (LMP Unknown)   SpO2 98%   Breastfeeding No   BMI 41.66 kg/m²      Medications:    Current Outpatient Medications:   •  amLODIPine (NORVASC) 10 MG tablet, Take 10 mg by mouth Daily., Disp: , Rfl:   •  amLODIPine (NORVASC) 5 MG tablet, Take 5 mg by mouth Daily., Disp: , Rfl: 0  •  busPIRone (BUSPAR) 10 MG tablet, Take 10 mg by mouth Every 12 (Twelve) Hours., Disp: , Rfl:   •  Cetirizine HCl (zyrTEC) 5 MG/5ML solution solution, Take  by mouth Daily., Disp: , Rfl:   •  Chlorpheniramine-DM (CORICIDIN HBP COUGH/COLD PO), Take  by mouth., Disp: , Rfl:   •  citalopram (CeleXA) 10 MG tablet, Take 10 mg by mouth Daily., Disp: , Rfl:   •  cyclobenzaprine " "(FLEXERIL) 5 MG tablet, Take 5 mg by mouth., Disp: , Rfl:   •  dicyclomine (BENTYL) 10 MG capsule, Take 1 capsule by mouth 3 (Three) Times a Day As Needed (abdominal pain/diarrhea)., Disp: 90 capsule, Rfl: 5  •  Lactobacillus (PROBIOTIC ACIDOPHILUS PO), Take  by mouth., Disp: , Rfl:   •  lisinopril-hydrochlorothiazide (PRINZIDE,ZESTORETIC) 20-25 MG per tablet, , Disp: , Rfl: 0  •  norethindrone (MICRONOR) 0.35 MG tablet, Take 1 tablet by mouth Daily., Disp: , Rfl:   •  pantoprazole (PROTONIX) 40 MG EC tablet, , Disp: , Rfl:   •  RANITIDINE HCL PO, Take  by mouth., Disp: , Rfl:   •  sucralfate (CARAFATE) 1 g tablet, , Disp: , Rfl:   •  buPROPion (WELLBUTRIN) 100 MG tablet, Take  by mouth., Disp: , Rfl:      Allergies:  Allergies   Allergen Reactions   • Latex Unknown - High Severity   • Other Unknown - High Severity   • Sulfa Antibiotics GI Intolerance       Physical Examination:  /72   Pulse 84   Temp 97.3 °F (36.3 °C)   Ht 172.7 cm (68\")   Wt 124 kg (274 lb)   LMP  (LMP Unknown)   SpO2 98%   Breastfeeding No   BMI 41.66 kg/m²   General Appearance:  Patient is in no distress.  She is well kept and has an obese build.   Psychiatric:  Patient with appropriate mood and affect. Alert and oriented to self, time, and place.    Breast, RIGHT:  small sized, 40B, symmetric with the contralateral side.  Breast skin is without erythema, edema, rashes.  There are no visible abnormalities upon inspection during the arm-raising maneuver or with hands on hips in the sitting position. There is no nipple retraction, discharge or nipple/areolar skin changes.There are no masses palpable in the sitting or supine positions.     Breast, LEFT:  small sized, 40B, symmetric with the contralateral side.  Breast skin is without erythema, edema, rashes.  There are no visible abnormalities upon inspection during the arm-raising maneuver or with hands on hips in the sitting position. There is no nipple retraction, discharge or " nipple/areolar skin changes.There are no masses palpable in the sitting or supine positions.    Lymphatic:  There is no axillary, cervical, infraclavicular, or supraclavicular adenopathy bilaterally.  Eyes:  Pupils are round and reactive to light.  Cardiovascular:  Heart rate and rhythm are regular.  Respiratory:  Lungs are clear bilaterally with no crackles or wheezes in any lung field.  Gastrointestinal:  Abdomen is soft, nondistended, and nontender.     Musculoskeletal:  Good strength in all 4 extremities.   There is good range of motion in both shoulders.    Skin:  No new skin lesions or rashes on the skin excluding the breast (see breast exam above).        Imagin2019  [facility]   Mammo  [facility]  MAMMO SCREENING BILATERAL  Heterogeneously dense.  Focal asymmetries bilateral retroareolar and deep posterior right breast.  BI-RADS CATEGORY: 0    2019  [facility]  BILATERAL DIAGNOSTIC MAMMOGRAM  DIAGNOSTIC BILATERAL BREAST ULRASOUND  On compression view suggestion of an 8 mm diameter irregular density 8 cm from the nipple in the right breast; suggestion of several poorly defined nodular densities in the retroareolar regions, left somewhat greater than right.  ULTRASOUND:  Right breast:  12:00 8 cm from the nipple, corresponding to findings on right view hypoechoic structure measuring 8 x 4 mm. Microlobulated. Minimally suspicious for neoplasia. Mildly prominent ductal structures in the retroareolar regions all quadrants. 3:00 1 cm from the nipple measures 4 mm. Minimally suspicious for neoplasia. At 7:00 3 cm from the nipple hypoechoic elongated structure 1.3 x 3.9 mm having the appearance of a cyst. 9:00 5 cm from the nipple very irregular structure which is heterogeneous and quite difficult to classify in its entirety. Some suspicious features of poorly defined margins and possibly taller than wide 5 x 7 mm.  Left breast:  1:00 7 cm from the nipple 5 to 6 mm in  length. Mildly suspicious for neoplasia. 1:00 3 cm from the nipple mixed echogenic mass. 5.4 x 2.1 mm minimally suspicious for neoplasia. 2:00 3 cm from the nipple area of heterogeneity. Within the center of this is a hypoechoic structure measuring 2.6 x 2.0 mm. minimally suspicious for neoplasia. 8:00 1 cm from the nipple. Appearance of a possible cyst 4 x 8 mm.  IMPRESSION:  Because of the numerous densities, I would recommend that the patient have bilateral breast MRI.  BI-RADS CATEGORY: 0    Final report on bilateral diagnostic mammogram and right breast ultrasound [facility] Dr. Barrow dated April 17, 2019: With additional imaging no abnormality is seen within the retroareolar regions of both breasts.  Comparison is made to prior mammograms of March 21, 2019 and February 25, 2019.  In the posterior one third right breast there is a persistent asymmetry at locates to the superior half of the breast near 12:00.  Measures approximately 8 mm.  Right breast ultrasound shows at the 12 o'clock position, 8 cm from the nipple is a 7 mm mass with macrolobulated.  BI-RADS 3 recommend six-month mammographic and sonographic follow-up of the right breast.  There is no mammographic evidence for any abnormality in the retroareolar region of either breast.  BI-RADS 3       Right diagnostic mammogram with 3D December 27, 2019: And right breast ultrasound.  [facility].  Scattered fibroglandular densities.  Nodular density posterior one third right breast is less apparent.  Correlating right breast ultrasound demonstrates a hypoechoic nodule at 12:00, 8 cm from the nipple measuring 5 x 5 mm.  Nodule is slightly smaller since the ultrasound exam of April 16, 2019 when it measured 7 x 7 mm.  Conclusion benign right-sided mammogram and right breast ultrasound.  Recommend annual mammogram and limited right breast ultrasound in April 2020. BR2    6-5-20 [facility] bilateral mammogram with  right breast ultrasound: Scattered fibroglandular densities.  No suspicious findings in either breast.  Right ultrasound 12:00, 8 cm from the nipple there is a stable 4 mm benign complex cyst.  BI-RADS 2          Procedures:      Assessment:   Diagnosis Plan   1. Abnormal mammogram     2. Abnormal ultrasound of breast     3. Morbid (severe) obesity due to excess calories (CMS/HCC)     4. FH: breast cancer     5. Tobacco use          1-2  RIGHT breast 12:00, 8 CFN- density on mammogram, 8mm complex cyst on ultrasound- BR3 RIGHT US. LEFT imaging negative.    3-  BMI 37-- > 41 after 27 pound weight gain in 2020    4-  MA in her mid 50s, MGA, age uncertain    5-  1/2ppd since 1994    Plan:  We reviewed her interval history, imaging, imaging reports and examination together today.  We have followed the imaging to the point of clinical stability. There are no worrisome findings on imaging or examination.  At this juncture she can return to routine annual screening. Her next routine annual screening mammogram will be due June 6, 2021 at [facility].  I did caution her about the significant weight gain, especially as it pertains to breast cancer risk.  Previously, we discussed that her MA may consider talking with genetics due to her young age at diagnosis.    I asked her to continue her SBE  monthly in addition to her clinical breast exam and mammogram once a year, and to let us know if she has any changes or concerns in the future and we'd be happy to see her back..      Tatum Carvalho MD        Next Appointment:  Return for any future concerns.    Today I spent 20 minutes doing the following: Reviewing records, labs, outside imaging and reports in preparation for the patient visit; obtaining medical history; performing the physical exam; counseling and educating the patient and any available family or caregivers; ordering medications, tests or procedures; coordinating care with any other physicians on  her care team as needed, and documenting all of the above in the medical record as well as sending communications with her other healthcare professionals.      EMR Dragon/transcription disclaimer:    Much of this encounter note is an electronic transcription/translocation of spoken language to printed text.  The electronic translation of spoken language may permit erroneous, or at times, nonsensical words or phrases to be inadvertently transcribed.  Although I have reviewed the note from such areas, some may still exist.